# Patient Record
(demographics unavailable — no encounter records)

---

## 2025-07-08 NOTE — ASSESSMENT
[FreeTextEntry1] : ALFREDO  has recovered well from her appendectomy.  I reviewed the pathology with the family.  She is cleared to resume normal activities at 2 weeks post op.  Counseled ALFREDO and her family about remembering that her appendix has been removed despite not having a noticeable abdominal incision or scar.  Post operative expectations reviewed. No need for further follow up, unless the family has concerns regarding the surgery or recovery.  All questions answered. Dr Still was into examine her and discuss post op expectations

## 2025-07-08 NOTE — CONSULT LETTER
[Dear  ___] : Dear  [unfilled], [Courtesy Letter:] : I had the pleasure of seeing your patient, [unfilled], in my office today. [Please see my note below.] : Please see my note below. [Consult Closing:] : Thank you very much for allowing me to participate in the care of this patient.  If you have any questions, please do not hesitate to contact me. [Sincerely,] : Sincerely, [FreeTextEntry2] : Dr Mckinney [FreeTextEntry3] : Ruby Mcnamara  MSN  CPNP Pediatric Nurse Practitioner Department of Pediatric Surgery [facility] phone [phone] fax [phone]